# Patient Record
Sex: FEMALE | Race: WHITE | Employment: FULL TIME | URBAN - METROPOLITAN AREA
[De-identification: names, ages, dates, MRNs, and addresses within clinical notes are randomized per-mention and may not be internally consistent; named-entity substitution may affect disease eponyms.]

---

## 2022-08-02 ENCOUNTER — HOSPITAL ENCOUNTER (EMERGENCY)
Age: 27
Discharge: HOME | End: 2022-08-02
Attending: EMERGENCY MEDICINE
Payer: MEDICAID

## 2022-08-02 VITALS
HEIGHT: 68 IN | RESPIRATION RATE: 20 BRPM | HEART RATE: 102 BPM | WEIGHT: 137.57 LBS | DIASTOLIC BLOOD PRESSURE: 106 MMHG | BODY MASS INDEX: 20.85 KG/M2 | OXYGEN SATURATION: 99 % | SYSTOLIC BLOOD PRESSURE: 138 MMHG | TEMPERATURE: 98.8 F

## 2022-08-02 DIAGNOSIS — H65.92 LEFT OTITIS MEDIA WITH EFFUSION: Primary | ICD-10-CM

## 2022-08-02 DIAGNOSIS — H60.502 ACUTE OTITIS EXTERNA OF LEFT EAR, UNSPECIFIED TYPE: ICD-10-CM

## 2022-08-02 DIAGNOSIS — H92.02 ACUTE PAIN OF LEFT EAR: ICD-10-CM

## 2022-08-02 PROCEDURE — 99283 EMERGENCY DEPT VISIT LOW MDM: CPT

## 2022-08-02 RX ORDER — HYDROCODONE BITARTRATE AND ACETAMINOPHEN 5; 325 MG/1; MG/1
1 TABLET ORAL
Qty: 10 TABLET | Refills: 0 | Status: SHIPPED | OUTPATIENT
Start: 2022-08-02 | End: 2022-08-05

## 2022-08-02 RX ORDER — NAPROXEN 500 MG/1
500 TABLET ORAL 2 TIMES DAILY WITH MEALS
Qty: 20 TABLET | Refills: 0 | Status: SHIPPED | OUTPATIENT
Start: 2022-08-02 | End: 2022-08-12

## 2022-08-02 RX ORDER — NEOMYCIN SULFATE, POLYMYXIN B SULFATE AND HYDROCORTISONE 10; 3.5; 1 MG/ML; MG/ML; [USP'U]/ML
3 SUSPENSION/ DROPS AURICULAR (OTIC) 4 TIMES DAILY
Qty: 5 ML | Refills: 0 | Status: SHIPPED | OUTPATIENT
Start: 2022-08-02

## 2022-08-02 RX ORDER — AMOXICILLIN AND CLAVULANATE POTASSIUM 875; 125 MG/1; MG/1
1 TABLET, FILM COATED ORAL 2 TIMES DAILY
Qty: 14 TABLET | Refills: 0 | Status: SHIPPED | OUTPATIENT
Start: 2022-08-02 | End: 2022-08-09

## 2022-08-02 NOTE — Clinical Note
[address]  [facility] EMERGENCY DEPT  [address]  [address] [place] [id number]  [phone]    Work/School Note    Date: 8/2/2022    To Whom It May concern:      Moises Pichardo was seen and treated today in the emergency room by the following provider(s):  Attending Provider: Tony Sanchez MD.      Moises Pichardo is excused from work/school on 08/02/22. She is clear to return to work/school on 08/03/22.         Sincerely,          Sharon Hernandez MD

## 2022-08-02 NOTE — Clinical Note
[address]  [facility] EMERGENCY DEPT  [address] [address]  [phone]    Work/School Note    Date: 8/2/2022    To Whom It May concern:      Nadia Lopez was seen and treated today in the emergency room by the following provider(s):  Attending Provider: Mari Bo MD.      Nadia Lopez is excused from work/school on 08/02/22. She is clear to return to work/school on 08/03/22.         Sincerely,          Jeana Angulo MD

## 2022-08-02 NOTE — ED PROVIDER NOTES
EMERGENCY DEPARTMENT HISTORY AND PHYSICAL EXAM      Date: 8/2/2022  Patient Name: Rodney Ramirez    History of Presenting Illness     Chief Complaint   Patient presents with    Ear Pain     Patient arrives with complaint of left ear pain for the past few days. Patient has been taking OTC medications, tonight she reports that the pain is now into her jaw and she is unable to hear out of her ear. Patient reports that she was asleep in the car and woke up to a loud pop and now the pain is severe. History Provided By: Patient    HPI: Rodney Ramirez, 27 y.o. female with no significant past medical history presents to the ED with chief complaint of severe left ear pain for the past 2 days. Patient is traveling from [place] to [facility] for her job. Reports pain that started 2 days ago, but tonight she was asleep in the car and she felt a \"pop\" in her left ear and felt like there was some drainage in her throat. Since then she has had been having severe, unrelenting pain in her left ear that radiates to her left cheek and jaw. Reports decreased hearing in her left ear. Denies any fevers or chills. Denies any sore throat, runny nose. Denies any drainage from her ear. She has not been swimming recently. PCP: None    No current facility-administered medications on file prior to encounter. No current outpatient medications on file prior to encounter. Past History     Past Medical History:  History reviewed. No pertinent past medical history. Past Surgical History:  History reviewed. No pertinent surgical history. Family History:  History reviewed. No pertinent family history. Social History:  Social History     Tobacco Use    Smoking status: Unknown       Allergies:  No Known Allergies      Review of Systems   Review of Systems   Constitutional:  Negative for chills and fever. HENT:  Positive for ear pain and hearing loss.  Negative for ear discharge, facial swelling, rhinorrhea and sore throat. Eyes:  Negative for redness. Respiratory:  Negative for cough and shortness of breath. Gastrointestinal:  Negative for abdominal pain. All other systems reviewed and are negative. Physical Exam   General appearance - well nourished, well appearing, and in no distress  Eyes - pupils equal and reactive, extraocular eye movements intact  ENT - mucous membranes moist, pharynx normal without lesions  Neck - supple, no significant adenopathy; non-tender to palpation right TM is clear, left TM is markedly erythematous and retracted, increased pain with tragus tug on left, left external auditory canal is mildly edematous  Chest - clear to auscultation  Heart - normal rate and regular rhythm,    Neurological - alert, oriented x3, normal speech, no focal findings or movement disorder noted    Diagnostic Study Results     Labs -   No results found for this or any previous visit (from the past 12 hour(s)). Radiologic Studies -   No orders to display     CT Results  (Last 48 hours)      None          CXR Results  (Last 48 hours)      None              Medical Decision Making   I am the first provider for this patient. I reviewed the vital signs, available nursing notes, past medical history, past surgical history, family history and social history. Vital Signs-Reviewed the patient's vital signs. Patient Vitals for the past 12 hrs:   Temp Pulse Resp BP SpO2   08/02/22 0105 98.8 °F (37.1 °C) (!) 102 20 (!) 138/106 99 %           Records Reviewed: Nursing Notes and Old Medical Records    Provider Notes (Medical Decision Making):   Differential diagnosis: Otitis media, otitis externa, perforated TM    ED Course:   Initial assessment performed. The patients presenting problems have been discussed, and they are in agreement with the care plan formulated and outlined with them. I have encouraged them to ask questions as they arise throughout their visit. Progress Notes:   Will treat with Augmentin and Cortisporin otic. Will treat with 3 days of analgesics given severe pain. Patient also instructed to take ibuprofen. Follow-up with ENT or PCP. Disposition:  . Discharge home    PLAN:  1. Current Discharge Medication List        START taking these medications    Details   amoxicillin-clavulanate (Augmentin) 875-125 mg per tablet Take 1 Tablet by mouth two (2) times a day for 7 days. Qty: 14 Tablet, Refills: 0  Start date: 8/2/2022, End date: 8/9/2022      HYDROcodone-acetaminophen (Norco) 5-325 mg per tablet Take 1 Tablet by mouth every six (6) hours as needed for Pain for up to 3 days. Max Daily Amount: 4 Tablets. Qty: 10 Tablet, Refills: 0  Start date: 8/2/2022, End date: 8/5/2022    Associated Diagnoses: Acute pain of left ear; Acute otitis externa of left ear, unspecified type      neomycin-polymyxin-hydrocortisone, buffered, (PEDIOTIC) 3.5-10,000-1 mg/mL-unit/mL-% otic suspension Administer 3 Drops in left ear four (4) times daily. Qty: 5 mL, Refills: 0  Start date: 8/2/2022      naproxen (Naprosyn) 500 mg tablet Take 1 Tablet by mouth two (2) times daily (with meals) for 10 days. Qty: 20 Tablet, Refills: 0  Start date: 8/2/2022, End date: 8/12/2022           2. Follow-up Information       Follow up With Specialties Details Why Contact Info    [facility] EMERGENCY DEPT Emergency Medicine  If symptoms worsen [address] [place] [address] Drive  [address] [facility]  [phone]    your PCP in you home [facility] or the ENT of your choice  Schedule an appointment as soon as possible for a visit             Return to ED if worse     Diagnosis     Clinical Impression:   1. Left otitis media with effusion    2. Acute pain of left ear    3.  Acute otitis externa of left ear, unspecified type